# Patient Record
Sex: FEMALE | Race: WHITE | Employment: FULL TIME | URBAN - METROPOLITAN AREA
[De-identification: names, ages, dates, MRNs, and addresses within clinical notes are randomized per-mention and may not be internally consistent; named-entity substitution may affect disease eponyms.]

---

## 2017-03-17 ENCOUNTER — OFFICE VISIT (OUTPATIENT)
Dept: FAMILY MEDICINE CLINIC | Age: 52
End: 2017-03-17

## 2017-03-17 VITALS
HEART RATE: 104 BPM | HEIGHT: 65 IN | BODY MASS INDEX: 34.45 KG/M2 | SYSTOLIC BLOOD PRESSURE: 132 MMHG | DIASTOLIC BLOOD PRESSURE: 82 MMHG | WEIGHT: 206.8 LBS

## 2017-03-17 DIAGNOSIS — M79.672 LEFT FOOT PAIN: Primary | ICD-10-CM

## 2017-03-17 DIAGNOSIS — E66.3 OVERWEIGHT: ICD-10-CM

## 2017-03-17 DIAGNOSIS — J30.89 ALLERGIC RHINITIS DUE TO OTHER ALLERGEN: ICD-10-CM

## 2017-03-17 DIAGNOSIS — Z12.11 SCREENING FOR COLON CANCER: ICD-10-CM

## 2017-03-17 DIAGNOSIS — Z87.891 HISTORY OF TOBACCO USE: ICD-10-CM

## 2017-03-17 DIAGNOSIS — Z78.0 MENOPAUSE: ICD-10-CM

## 2017-03-17 PROCEDURE — 99203 OFFICE O/P NEW LOW 30 MIN: CPT | Performed by: FAMILY MEDICINE

## 2017-03-17 ASSESSMENT — ENCOUNTER SYMPTOMS
COUGH: 0
SHORTNESS OF BREATH: 0
GASTROINTESTINAL NEGATIVE: 1
EYES NEGATIVE: 1
WHEEZING: 0
RESPIRATORY NEGATIVE: 1
APNEA: 0

## 2017-04-06 ENCOUNTER — OFFICE VISIT (OUTPATIENT)
Dept: ORTHOPEDIC SURGERY | Age: 52
End: 2017-04-06

## 2017-04-06 VITALS
DIASTOLIC BLOOD PRESSURE: 76 MMHG | HEIGHT: 64 IN | BODY MASS INDEX: 34.15 KG/M2 | HEART RATE: 74 BPM | WEIGHT: 200 LBS | SYSTOLIC BLOOD PRESSURE: 124 MMHG

## 2017-04-06 DIAGNOSIS — M72.2 PLANTAR FASCIITIS, LEFT: Primary | ICD-10-CM

## 2017-04-06 PROCEDURE — 99202 OFFICE O/P NEW SF 15 MIN: CPT | Performed by: PODIATRIST

## 2017-07-17 ENCOUNTER — TELEPHONE (OUTPATIENT)
Dept: FAMILY MEDICINE CLINIC | Age: 52
End: 2017-07-17

## 2017-07-21 ENCOUNTER — TELEPHONE (OUTPATIENT)
Dept: FAMILY MEDICINE CLINIC | Age: 52
End: 2017-07-21

## 2017-07-21 PROBLEM — I26.99 PULMONARY EMBOLISM WITHOUT ACUTE COR PULMONALE (HCC): Status: ACTIVE | Noted: 2017-07-17

## 2017-07-28 ENCOUNTER — TELEPHONE (OUTPATIENT)
Dept: FAMILY MEDICINE CLINIC | Age: 52
End: 2017-07-28

## 2017-08-21 ENCOUNTER — OFFICE VISIT (OUTPATIENT)
Dept: FAMILY MEDICINE CLINIC | Age: 52
End: 2017-08-21

## 2017-08-21 VITALS — DIASTOLIC BLOOD PRESSURE: 80 MMHG | HEART RATE: 113 BPM | SYSTOLIC BLOOD PRESSURE: 124 MMHG

## 2017-08-21 DIAGNOSIS — I26.90 CHRONIC SEPTIC PULMONARY EMBOLISM WITHOUT ACUTE COR PULMONALE (HCC): Primary | ICD-10-CM

## 2017-08-21 DIAGNOSIS — I27.82 CHRONIC SEPTIC PULMONARY EMBOLISM WITHOUT ACUTE COR PULMONALE (HCC): Primary | ICD-10-CM

## 2017-08-21 PROCEDURE — 99213 OFFICE O/P EST LOW 20 MIN: CPT | Performed by: FAMILY MEDICINE

## 2017-08-21 ASSESSMENT — PATIENT HEALTH QUESTIONNAIRE - PHQ9
2. FEELING DOWN, DEPRESSED OR HOPELESS: 0
1. LITTLE INTEREST OR PLEASURE IN DOING THINGS: 0
SUM OF ALL RESPONSES TO PHQ QUESTIONS 1-9: 0
SUM OF ALL RESPONSES TO PHQ9 QUESTIONS 1 & 2: 0

## 2017-12-12 DIAGNOSIS — I27.82 CHRONIC SEPTIC PULMONARY EMBOLISM WITHOUT ACUTE COR PULMONALE (HCC): ICD-10-CM

## 2017-12-12 DIAGNOSIS — I26.90 CHRONIC SEPTIC PULMONARY EMBOLISM WITHOUT ACUTE COR PULMONALE (HCC): ICD-10-CM

## 2017-12-13 RX ORDER — RIVAROXABAN 20 MG/1
TABLET, FILM COATED ORAL
Qty: 30 TABLET | Refills: 1 | Status: SHIPPED | OUTPATIENT
Start: 2017-12-13 | End: 2019-10-04

## 2018-03-12 ENCOUNTER — TELEPHONE (OUTPATIENT)
Dept: FAMILY MEDICINE CLINIC | Age: 53
End: 2018-03-12

## 2018-03-12 NOTE — TELEPHONE ENCOUNTER
Pt states she will be having  oral surgery soon and wants to know if its ok to do so since shes been taking Xarelto  for the pass 6 month.    Pt also wants to know if she should still continue to take  Xarelto she thought it was only for up to 7 months Please review and advise

## 2019-10-02 ENCOUNTER — HOSPITAL ENCOUNTER (OUTPATIENT)
Dept: GENERAL RADIOLOGY | Age: 54
Discharge: HOME | End: 2019-10-02
Payer: COMMERCIAL

## 2019-10-02 ENCOUNTER — HOSPITAL ENCOUNTER (OUTPATIENT)
Age: 54
Discharge: HOME | End: 2019-10-02
Payer: COMMERCIAL

## 2019-10-02 ENCOUNTER — TELEPHONE (OUTPATIENT)
Dept: PRIMARY CARE CLINIC | Age: 54
End: 2019-10-02

## 2019-10-02 ENCOUNTER — OFFICE VISIT (OUTPATIENT)
Dept: PRIMARY CARE CLINIC | Age: 54
End: 2019-10-02
Payer: COMMERCIAL

## 2019-10-02 VITALS
OXYGEN SATURATION: 99 % | SYSTOLIC BLOOD PRESSURE: 130 MMHG | HEART RATE: 89 BPM | DIASTOLIC BLOOD PRESSURE: 84 MMHG | BODY MASS INDEX: 32.96 KG/M2 | WEIGHT: 192 LBS

## 2019-10-02 DIAGNOSIS — Z88.9 MULTIPLE DRUG ALLERGIES: ICD-10-CM

## 2019-10-02 DIAGNOSIS — M54.42 ACUTE MIDLINE LOW BACK PAIN WITH LEFT-SIDED SCIATICA: Primary | ICD-10-CM

## 2019-10-02 DIAGNOSIS — M54.42 ACUTE MIDLINE LOW BACK PAIN WITH LEFT-SIDED SCIATICA: ICD-10-CM

## 2019-10-02 PROCEDURE — 72100 X-RAY EXAM L-S SPINE 2/3 VWS: CPT

## 2019-10-02 PROCEDURE — 99214 OFFICE O/P EST MOD 30 MIN: CPT | Performed by: FAMILY MEDICINE

## 2019-10-02 RX ORDER — METHYLPREDNISOLONE 4 MG/1
TABLET ORAL
Qty: 1 KIT | Refills: 0 | Status: SHIPPED | OUTPATIENT
Start: 2019-10-02 | End: 2019-10-08

## 2019-10-02 ASSESSMENT — ENCOUNTER SYMPTOMS
ABDOMINAL PAIN: 0
SHORTNESS OF BREATH: 0
EYES NEGATIVE: 1
CHEST TIGHTNESS: 0
BACK PAIN: 1

## 2019-10-02 ASSESSMENT — PATIENT HEALTH QUESTIONNAIRE - PHQ9
SUM OF ALL RESPONSES TO PHQ9 QUESTIONS 1 & 2: 0
SUM OF ALL RESPONSES TO PHQ QUESTIONS 1-9: 0
2. FEELING DOWN, DEPRESSED OR HOPELESS: 0
SUM OF ALL RESPONSES TO PHQ QUESTIONS 1-9: 0
1. LITTLE INTEREST OR PLEASURE IN DOING THINGS: 0

## 2019-10-04 DIAGNOSIS — M51.36 DDD (DEGENERATIVE DISC DISEASE), LUMBAR: ICD-10-CM

## 2019-10-04 DIAGNOSIS — M54.40 MIDLINE LOW BACK PAIN WITH SCIATICA, SCIATICA LATERALITY UNSPECIFIED, UNSPECIFIED CHRONICITY: Primary | ICD-10-CM

## 2019-10-07 ENCOUNTER — TELEPHONE (OUTPATIENT)
Dept: PRIMARY CARE CLINIC | Age: 54
End: 2019-10-07

## 2019-11-06 ENCOUNTER — HOSPITAL ENCOUNTER (OUTPATIENT)
Dept: PHYSICAL THERAPY | Age: 54
Setting detail: THERAPIES SERIES
Discharge: HOME | End: 2019-11-06
Payer: COMMERCIAL

## 2019-11-06 PROCEDURE — 97110 THERAPEUTIC EXERCISES: CPT

## 2019-11-06 PROCEDURE — 97161 PT EVAL LOW COMPLEX 20 MIN: CPT

## 2019-11-11 ENCOUNTER — HOSPITAL ENCOUNTER (OUTPATIENT)
Dept: PHYSICAL THERAPY | Age: 54
Setting detail: THERAPIES SERIES
Discharge: HOME | End: 2019-11-11
Payer: COMMERCIAL

## 2019-11-11 PROCEDURE — 97110 THERAPEUTIC EXERCISES: CPT

## 2019-11-18 ENCOUNTER — HOSPITAL ENCOUNTER (OUTPATIENT)
Dept: PHYSICAL THERAPY | Age: 54
Setting detail: THERAPIES SERIES
Discharge: HOME | End: 2019-11-18
Payer: COMMERCIAL

## 2019-11-25 ENCOUNTER — HOSPITAL ENCOUNTER (OUTPATIENT)
Dept: PHYSICAL THERAPY | Age: 54
Setting detail: THERAPIES SERIES
Discharge: HOME | End: 2019-11-25
Payer: COMMERCIAL

## 2019-11-25 PROCEDURE — G0283 ELEC STIM OTHER THAN WOUND: HCPCS

## 2019-11-25 PROCEDURE — 97110 THERAPEUTIC EXERCISES: CPT

## 2019-12-04 ENCOUNTER — HOSPITAL ENCOUNTER (OUTPATIENT)
Dept: PHYSICAL THERAPY | Age: 54
Setting detail: THERAPIES SERIES
Discharge: HOME | End: 2019-12-04
Payer: COMMERCIAL

## 2019-12-04 PROCEDURE — 97110 THERAPEUTIC EXERCISES: CPT

## 2019-12-09 ENCOUNTER — HOSPITAL ENCOUNTER (OUTPATIENT)
Dept: PHYSICAL THERAPY | Age: 54
Setting detail: THERAPIES SERIES
Discharge: HOME | End: 2019-12-09
Payer: COMMERCIAL

## 2019-12-09 PROCEDURE — 97110 THERAPEUTIC EXERCISES: CPT

## 2021-11-05 ENCOUNTER — OFFICE VISIT (OUTPATIENT)
Dept: PRIMARY CARE CLINIC | Age: 56
End: 2021-11-05
Payer: COMMERCIAL

## 2021-11-05 VITALS
DIASTOLIC BLOOD PRESSURE: 84 MMHG | OXYGEN SATURATION: 98 % | SYSTOLIC BLOOD PRESSURE: 104 MMHG | HEIGHT: 65 IN | BODY MASS INDEX: 31.89 KG/M2 | TEMPERATURE: 98.6 F | WEIGHT: 191.4 LBS | HEART RATE: 88 BPM

## 2021-11-05 DIAGNOSIS — R10.13 EPIGASTRIC ABDOMINAL PAIN: ICD-10-CM

## 2021-11-05 DIAGNOSIS — Z13.220 LIPID SCREENING: ICD-10-CM

## 2021-11-05 DIAGNOSIS — M54.9 MID BACK PAIN: ICD-10-CM

## 2021-11-05 DIAGNOSIS — R14.0 ABDOMINAL BLOATING: Primary | ICD-10-CM

## 2021-11-05 DIAGNOSIS — Z13.1 DIABETES MELLITUS SCREENING: ICD-10-CM

## 2021-11-05 PROCEDURE — 99214 OFFICE O/P EST MOD 30 MIN: CPT | Performed by: FAMILY MEDICINE

## 2021-11-05 SDOH — ECONOMIC STABILITY: FOOD INSECURITY: WITHIN THE PAST 12 MONTHS, THE FOOD YOU BOUGHT JUST DIDN'T LAST AND YOU DIDN'T HAVE MONEY TO GET MORE.: NEVER TRUE

## 2021-11-05 SDOH — ECONOMIC STABILITY: FOOD INSECURITY: WITHIN THE PAST 12 MONTHS, YOU WORRIED THAT YOUR FOOD WOULD RUN OUT BEFORE YOU GOT MONEY TO BUY MORE.: NEVER TRUE

## 2021-11-05 ASSESSMENT — ENCOUNTER SYMPTOMS
ALLERGIC/IMMUNOLOGIC NEGATIVE: 1
EYES NEGATIVE: 1
BACK PAIN: 1
ABDOMINAL PAIN: 1

## 2021-11-05 ASSESSMENT — PATIENT HEALTH QUESTIONNAIRE - PHQ9
1. LITTLE INTEREST OR PLEASURE IN DOING THINGS: 0
SUM OF ALL RESPONSES TO PHQ QUESTIONS 1-9: 0
2. FEELING DOWN, DEPRESSED OR HOPELESS: 0
SUM OF ALL RESPONSES TO PHQ QUESTIONS 1-9: 0
SUM OF ALL RESPONSES TO PHQ QUESTIONS 1-9: 0
SUM OF ALL RESPONSES TO PHQ9 QUESTIONS 1 & 2: 0

## 2021-11-05 ASSESSMENT — SOCIAL DETERMINANTS OF HEALTH (SDOH): HOW HARD IS IT FOR YOU TO PAY FOR THE VERY BASICS LIKE FOOD, HOUSING, MEDICAL CARE, AND HEATING?: NOT HARD AT ALL

## 2021-11-05 NOTE — PROGRESS NOTES
SUBJECTIVE:  Patient ID: Bryn Hernandez is a 55 y.o. female. Chief Complaint:  Chief Complaint   Patient presents with    Back Pain     bra strap line radiates to front.   worse at night x 6-8 wks       HPI   55year old Female  Pain start Back Rt side  Off/on x 6-8 weeks  Radiates to front mid abdomen last 4-5 H   Walk up & down  Stretching   Peppermint   No N/V/D  Always incident late @night 12-1 am    Past Medical History:   Diagnosis Date    Chronic sinusitis     Environmental allergies     Mitral (valve) prolapse      Past Surgical History:   Procedure Laterality Date    LEG SURGERY Left 2017    Fx Tibia & fibula + plate & pins    SINUS SURGERY  age 32 yo     Allergies   Allergen Reactions    Aleve [Naproxen Sodium]      Hives    Aspirin      hives    Erythromycin      Hives  disorientation    Ibuprofen      Hives     Influenza Vac Typ A&B Surf Ant      Swelling & throat @young age before teen age year   [place]ella [place] Nitrous Oxide      Swelling & throat    Penicillins      Hives & swelling    Sulfa Antibiotics      Swelling & Hives        Family History   Problem Relation Age of Onset    Other Mother         DM ,Kid disorder ,+ renal stent + heart ,age [de-identified]     Diabetes Father         CABG x4  age 80     High Cholesterol Father     Heart Disease Father     Other Maternal Grandfather          @age 80    Other Sister         @age 52 Brain aneurysm     Social History     Social History Narrative     x 1 year after 21 years dating    No children    No tobacco    FT job       Patient Active Problem List   Diagnosis    Sinusitis, chronic    Allergic rhinitis due to other allergen    Menopause    History of tobacco use    Patient overweight    Plantar fasciitis, left    Pulmonary embolism without acute cor pulmonale (HCC)     Current Outpatient Medications   Medication Sig Dispense Refill    Triamcinolone Acetonide (NASACORT AQ NA) by Nasal route daily      fexofenadine (ALLEGRA) 180 MG tablet Take 180 mg by mouth daily. No current facility-administered medications for this visit. No results found for: WBC, HGB, HCT, MCV, PLT  No results found for: CHOL  No results found for: TRIG  No results found for: HDL  No results found for: LDLCHOLESTEROL, LDLCALC  No results found for: LABVLDL, VLDL  No results found for: CHOLHDLRATIO    Chemistry    No results found for: NA, K, CL, CO2, BUN, CREATININE No results found for: CALCIUM, ALKPHOS, AST, ALT, BILITOT         Review of Systems   Constitutional: Negative. HENT: Negative. Eyes: Negative. Respiratory:        Hx PE 2017 post RT leg injury & surgery   Cardiovascular: Negative. Negative for chest pain, palpitations and leg swelling. Gastrointestinal: Positive for abdominal pain. No colonoscopy yet  Bloating  Epigastric pain   Endocrine: Negative. Genitourinary: Negative for dysuria, flank pain and urgency. GYN 2017  Uterus & ovaries intact  Menopause age 44  Last Mammogram 2018   Musculoskeletal: Positive for back pain. Mid back pain off/on x 8 w   Allergic/Immunologic: Negative. Dr Soraya Lara  2 allergy shot every month   Neurological: Negative. Negative for dizziness and headaches. Hematological: Negative. DVT 2017   + PE 2017   Rx Xarleto   Psychiatric/Behavioral: Negative for behavioral problems. OBJECTIVE:  /84 (Site: Right Upper Arm, Position: Sitting, Cuff Size: Large Adult)   Pulse 88   Temp 98.6 °F (37 °C) (Oral)   Ht 5' 4.5\" (1.638 m)   Wt 191 lb 6.4 oz (86.8 kg)   LMP 08/07/2010   SpO2 98%   BMI 32.35 kg/m²   Physical Exam  HENT:      Head: Normocephalic. Eyes:      Extraocular Movements: Extraocular movements intact. Pupils: Pupils are equal, round, and reactive to light. Cardiovascular:      Rate and Rhythm: Normal rate and regular rhythm. Heart sounds: Normal heart sounds.    Pulmonary:      Effort: Pulmonary effort is normal.      Breath sounds: Normal breath sounds. No wheezing or rhonchi. Abdominal:      General: There is no distension. Palpations: There is no mass. Tenderness: There is no guarding or rebound. Hernia: No hernia is present. Comments: Epigastric tendrness   Musculoskeletal:      Cervical back: Neck supple. Right lower leg: No edema. Left lower leg: No edema. Comments: Spine no point of tenderness   Skin:     Findings: No rash. Neurological:      General: No focal deficit present. Mental Status: She is alert and oriented to person, place, and time. Psychiatric:         Mood and Affect: Mood normal.         Behavior: Behavior normal.         Thought Content: Thought content normal.         Judgment: Judgment normal.         ASSESSMENT/PLAN:      Diagnosis Orders   1. Abdominal bloating  CT CHEST ABDOMEN PELVIS W WO CONTRAST    CBC Auto Differential    Comprehensive Metabolic Panel    TSH without Reflex   2. Mid back pain  CT CHEST ABDOMEN PELVIS W WO CONTRAST   3. Epigastric abdominal pain  CT CHEST ABDOMEN PELVIS W WO CONTRAST    CBC Auto Differential    Comprehensive Metabolic Panel    TSH without Reflex   4. Diabetes mellitus screening  Hemoglobin A1C   5.  Lipid screening  Lipid Panel       As above  Visit 30 min  Covid vaccine done  No Flu shot ?allergic  Advice due Mammogram & GYN update Dr Jaylene Hua  Advice due Colonoscopy

## 2021-11-11 ENCOUNTER — HOSPITAL ENCOUNTER (OUTPATIENT)
Dept: CT IMAGING | Age: 56
Discharge: HOME | End: 2021-11-11
Payer: COMMERCIAL

## 2021-11-11 DIAGNOSIS — M54.9 MID BACK PAIN: ICD-10-CM

## 2021-11-11 DIAGNOSIS — R10.13 EPIGASTRIC ABDOMINAL PAIN: ICD-10-CM

## 2021-11-11 DIAGNOSIS — R14.0 ABDOMINAL BLOATING: ICD-10-CM

## 2021-11-11 PROCEDURE — 6360000004 HC RX CONTRAST MEDICATION: Performed by: FAMILY MEDICINE

## 2021-11-11 PROCEDURE — 74177 CT ABD & PELVIS W/CONTRAST: CPT

## 2021-11-11 RX ADMIN — IOPAMIDOL 75 ML: 755 INJECTION, SOLUTION INTRAVENOUS at 08:53

## 2021-11-11 RX ADMIN — IOHEXOL 50 ML: 240 INJECTION, SOLUTION INTRATHECAL; INTRAVASCULAR; INTRAVENOUS; ORAL at 08:53

## 2021-11-12 DIAGNOSIS — Z13.220 LIPID SCREENING: ICD-10-CM

## 2021-11-12 DIAGNOSIS — K82.9 GALL BLADDER DISEASE: Primary | ICD-10-CM

## 2021-11-12 DIAGNOSIS — R14.0 ABDOMINAL BLOATING: ICD-10-CM

## 2021-11-12 DIAGNOSIS — R10.13 EPIGASTRIC ABDOMINAL PAIN: ICD-10-CM

## 2021-11-12 DIAGNOSIS — Z13.1 DIABETES MELLITUS SCREENING: ICD-10-CM

## 2021-11-12 LAB
A/G RATIO: 1.9 (ref 1.1–2.2)
ALBUMIN SERPL-MCNC: 4.4 G/DL (ref 3.4–5)
ALP BLD-CCNC: 112 U/L (ref 40–129)
ALT SERPL-CCNC: 17 U/L (ref 10–40)
ANION GAP SERPL CALCULATED.3IONS-SCNC: 15 MMOL/L (ref 3–16)
AST SERPL-CCNC: 16 U/L (ref 15–37)
BASOPHILS ABSOLUTE: 0 K/UL (ref 0–0.2)
BASOPHILS RELATIVE PERCENT: 0.7 %
BILIRUB SERPL-MCNC: 0.5 MG/DL (ref 0–1)
BUN BLDV-MCNC: 8 MG/DL (ref 7–20)
CALCIUM SERPL-MCNC: 9.5 MG/DL (ref 8.3–10.6)
CHLORIDE BLD-SCNC: 104 MMOL/L (ref 99–110)
CHOLESTEROL, TOTAL: 194 MG/DL (ref 0–199)
CO2: 25 MMOL/L (ref 21–32)
CREAT SERPL-MCNC: 0.7 MG/DL (ref 0.6–1.1)
EOSINOPHILS ABSOLUTE: 0.2 K/UL (ref 0–0.6)
EOSINOPHILS RELATIVE PERCENT: 3.1 %
GFR AFRICAN AMERICAN: >60
GFR NON-AFRICAN AMERICAN: >60
GLUCOSE BLD-MCNC: 90 MG/DL (ref 70–99)
HCT VFR BLD CALC: 40.3 % (ref 36–48)
HDLC SERPL-MCNC: 51 MG/DL (ref 40–60)
HEMOGLOBIN: 13.7 G/DL (ref 12–16)
LDL CHOLESTEROL CALCULATED: 127 MG/DL
LYMPHOCYTES ABSOLUTE: 2 K/UL (ref 1–5.1)
LYMPHOCYTES RELATIVE PERCENT: 33.1 %
MCH RBC QN AUTO: 33.7 PG (ref 26–34)
MCHC RBC AUTO-ENTMCNC: 33.9 G/DL (ref 31–36)
MCV RBC AUTO: 99.4 FL (ref 80–100)
MONOCYTES ABSOLUTE: 0.4 K/UL (ref 0–1.3)
MONOCYTES RELATIVE PERCENT: 7.5 %
NEUTROPHILS ABSOLUTE: 3.3 K/UL (ref 1.7–7.7)
NEUTROPHILS RELATIVE PERCENT: 55.6 %
PDW BLD-RTO: 13.2 % (ref 12.4–15.4)
PLATELET # BLD: 307 K/UL (ref 135–450)
PMV BLD AUTO: 9.4 FL (ref 5–10.5)
POTASSIUM SERPL-SCNC: 4.5 MMOL/L (ref 3.5–5.1)
RBC # BLD: 4.06 M/UL (ref 4–5.2)
SODIUM BLD-SCNC: 144 MMOL/L (ref 136–145)
TOTAL PROTEIN: 6.7 G/DL (ref 6.4–8.2)
TRIGL SERPL-MCNC: 81 MG/DL (ref 0–150)
TSH SERPL DL<=0.05 MIU/L-ACNC: 1.31 UIU/ML (ref 0.27–4.2)
VLDLC SERPL CALC-MCNC: 16 MG/DL
WBC # BLD: 6 K/UL (ref 4–11)

## 2021-11-13 LAB
ESTIMATED AVERAGE GLUCOSE: 105.4 MG/DL
HBA1C MFR BLD: 5.3 %

## 2021-11-19 ENCOUNTER — OFFICE VISIT (OUTPATIENT)
Dept: SURGERY | Age: 56
End: 2021-11-19
Payer: COMMERCIAL

## 2021-11-19 VITALS
HEART RATE: 88 BPM | DIASTOLIC BLOOD PRESSURE: 83 MMHG | WEIGHT: 192.4 LBS | BODY MASS INDEX: 32.06 KG/M2 | TEMPERATURE: 97.9 F | SYSTOLIC BLOOD PRESSURE: 113 MMHG | HEIGHT: 65 IN

## 2021-11-19 DIAGNOSIS — K80.20 SYMPTOMATIC CHOLELITHIASIS: Primary | ICD-10-CM

## 2021-11-19 PROCEDURE — 99204 OFFICE O/P NEW MOD 45 MIN: CPT | Performed by: SURGERY

## 2021-11-19 NOTE — LETTER
[facility] Sur[facility] of Ina Long M.D. [facility]  [address]  [address] [facility]. [address]  Phone: [phone] Fax: [phone]            Surgery Order/Time:  21/3:15 PM  Conf. # _________________  Scheduled by: Rikki Rail Lpn                               **[facility]**  Facility: [facility].    Surgery Date: 2021 Time: 730am    Pt arrival: 0530  Second Surgeon: N/A        Patient Name: Emory Michlele  : 1965  Home [id number] (home) [phone] (work) Address:[address] [place] [address] [address] [place] [address]  PCP:  Salty Martin MD   Does patient speak English?: yes    needed? no                                             PROCEDURE: Laparoscopic cholecystectomy with gram  CPT: 62672: Laparoscopic Cholecystectomy with Cholangiogram    DIAGNOSIS:      ICD-10-CM    1. Symptomatic cholelithiasis  K80.20        Anesthesia: General  Time Needed:  1 hour   Pt Position:  supine      Outpatient ____ Admit ______  Assist._____   Cardiac Clearance: no  Patient to meet with Anesthesiology prior to surgery: no    Patient Allergies: Allergies   Allergen Reactions    Aleve [Naproxen Sodium]      Hives    Aspirin      hives    Erythromycin      Hives  disorientation    Ibuprofen      Hives     Influenza Vac Typ A&B Surf Ant      Swelling & throat @young age before teen age year   [place] Nitrous Oxide      Swelling & throat    Penicillins      Hives & swelling    Sulfa Antibiotics      Swelling & Hives      Pre-Op H&P to be done by: Salty Martin MD  Pre-Op Antibiotics: Clindamycin: 600mg IV On Call to OR  ** Lovenox 40 mg SQ 2 hrs. Pre-op    Physician: Ana Rosa Iglesias MD Date: 21             Insurance:    ID #      Ph #   Date called ________________   Dm Owusu to: _____________ Precert Needed?  Yes  /  No  PreAuth # & Details   ______________________________________________________________________

## 2021-11-22 ENCOUNTER — TELEPHONE (OUTPATIENT)
Dept: SURGERY | Age: 56
End: 2021-11-22

## 2021-11-29 ENCOUNTER — TELEPHONE (OUTPATIENT)
Dept: PRIMARY CARE CLINIC | Age: 56
End: 2021-11-29

## 2021-11-29 NOTE — TELEPHONE ENCOUNTER
Patient called office she stated she was on the phone with someone that was checking what else she needed to get done before her surgery on 12/14, she was unsure if it was the pre cert department or not but if someone can reach out to patient with assistance

## 2021-12-03 ENCOUNTER — OFFICE VISIT (OUTPATIENT)
Dept: PRIMARY CARE CLINIC | Age: 56
End: 2021-12-03
Payer: COMMERCIAL

## 2021-12-03 VITALS
DIASTOLIC BLOOD PRESSURE: 87 MMHG | HEART RATE: 88 BPM | HEIGHT: 65 IN | OXYGEN SATURATION: 97 % | SYSTOLIC BLOOD PRESSURE: 116 MMHG | WEIGHT: 191 LBS | BODY MASS INDEX: 31.82 KG/M2 | TEMPERATURE: 97.4 F

## 2021-12-03 DIAGNOSIS — Z01.818 PREOPERATIVE CLEARANCE: Primary | ICD-10-CM

## 2021-12-03 DIAGNOSIS — K82.9 GALLBLADDER DISORDER: ICD-10-CM

## 2021-12-03 PROCEDURE — 99212 OFFICE O/P EST SF 10 MIN: CPT | Performed by: FAMILY MEDICINE

## 2021-12-03 ASSESSMENT — ENCOUNTER SYMPTOMS
SHORTNESS OF BREATH: 0
SINUS PRESSURE: 0
SINUS PAIN: 0
COUGH: 0
ABDOMINAL DISTENTION: 0
EYE DISCHARGE: 0
CHOKING: 0
SORE THROAT: 0
EYE ITCHING: 0
BLOOD IN STOOL: 0
ABDOMINAL PAIN: 0
RHINORRHEA: 0
COLOR CHANGE: 0
PHOTOPHOBIA: 0
CHEST TIGHTNESS: 0
EYE PAIN: 0
TROUBLE SWALLOWING: 0
NAUSEA: 0
VOMITING: 0
VOICE CHANGE: 0
EYE REDNESS: 0
APNEA: 0
DIARRHEA: 0
BACK PAIN: 0
WHEEZING: 0
CONSTIPATION: 0

## 2021-12-03 NOTE — PROGRESS NOTES
Component Value Date    WBC 6.0 11/12/2021    HGB 13.7 11/12/2021    HCT 40.3 11/12/2021    MCV 99.4 11/12/2021     11/12/2021     Lab Results   Component Value Date    CHOL 194 11/12/2021     Lab Results   Component Value Date    TRIG 81 11/12/2021     Lab Results   Component Value Date    HDL 51 11/12/2021     Lab Results   Component Value Date    LDLCALC 127 (H) 11/12/2021     Lab Results   Component Value Date    LABVLDL 16 11/12/2021     No results found for: [facility]    Chemistry        Component Value Date/Time     11/12/2021 1019    K 4.5 11/12/2021 1019     11/12/2021 1019    CO2 25 11/12/2021 1019    BUN 8 11/12/2021 1019    CREATININE 0.7 11/12/2021 1019        Component Value Date/Time    CALCIUM 9.5 11/12/2021 1019    ALKPHOS 112 11/12/2021 1019    AST 16 11/12/2021 1019    ALT 17 11/12/2021 1019    BILITOT 0.5 11/12/2021 1019            Review of Systems   Constitutional: Negative for activity change, appetite change, chills, diaphoresis, fatigue, fever and unexpected weight change. HENT: Negative for congestion, ear discharge, ear pain, hearing loss, nosebleeds, postnasal drip, rhinorrhea, sinus pressure, sinus pain, sore throat, tinnitus, trouble swallowing and voice change. Eyes: Negative for photophobia, pain, discharge, redness, itching and visual disturbance. Respiratory: Negative for apnea, cough, choking, chest tightness, shortness of breath and wheezing. Quit Tobacco 2016   Vap flavor   Cardiovascular: Negative for chest pain, palpitations and leg swelling. Gastrointestinal: Negative for abdominal distention, abdominal pain, blood in stool, constipation, diarrhea, nausea and vomiting. Colonoscopy is due   NO real abdominal pain  She will schedule after New Year   Endocrine: Negative for cold intolerance, heat intolerance, polydipsia, polyphagia and polyuria. Genitourinary: Negative for dysuria and frequency.         Uterus & Ovaries intact  GYN Dr Terence Saravia  Menopause @age 43   Musculoskeletal: Negative for back pain and gait problem. Skin: Negative for color change and rash. Allergic/Immunologic: Negative for environmental allergies and food allergies. Allergy shot twice x last 20 +years   Neurological: Negative for dizziness, tremors, light-headedness, numbness and headaches. Psychiatric/Behavioral: Negative for agitation, behavioral problems, decreased concentration and sleep disturbance. The patient is not hyperactive. Trouble sleeping  Get hot easily       OBJECTIVE:  /87 (Site: Left Upper Arm, Position: Sitting, Cuff Size: Medium Adult)   Pulse 88   Temp 97.4 °F (36.3 °C) (Oral)   Ht 5' 4.5\" (1.638 m)   Wt 191 lb (86.6 kg)   LMP 08/07/2010   SpO2 97%   BMI 32.28 kg/m²   Physical Exam  Constitutional:       General: She is not in acute distress. Appearance: She is well-developed. She is not diaphoretic. HENT:      Head: Normocephalic. Right Ear: External ear normal.      Left Ear: External ear normal.      Nose: Nose normal.   Eyes:      General: No scleral icterus. Right eye: No discharge. Left eye: No discharge. Conjunctiva/sclera: Conjunctivae normal.      Pupils: Pupils are equal, round, and reactive to light. Neck:      Thyroid: No thyromegaly. Cardiovascular:      Rate and Rhythm: Normal rate and regular rhythm. Heart sounds: Normal heart sounds. No murmur heard. Pulmonary:      Effort: Pulmonary effort is normal. No respiratory distress. Breath sounds: Normal breath sounds. No wheezing or rales. Chest:      Chest wall: No tenderness. Abdominal:      General: Bowel sounds are normal. There is no distension. Palpations: Abdomen is soft. There is no mass. Tenderness: There is no abdominal tenderness. There is no guarding or rebound. Musculoskeletal:         General: Normal range of motion. Cervical back: Normal range of motion and neck supple. Lymphadenopathy:      Cervical: No cervical adenopathy. Skin:     General: Skin is warm. Findings: No rash. Neurological:      Mental Status: She is alert and oriented to person, place, and time. Deep Tendon Reflexes: Reflexes are normal and symmetric. Psychiatric:         Behavior: Behavior normal.         Thought Content: Thought content normal.         Judgment: Judgment normal.         ASSESSMENT/PLAN:      Diagnosis Orders   1. Preoperative clearance     2.  Gallbladder disorder           OK for planned surgery Cholecystectomy by Dr Dalila Johnson was explained by surgeon  Pt is aware of possible risk & complications  DVT prophylaxis as per surgeon  Covid Vaccine x 2 done Booster after surgery  No Flu shot due Hx Allergic

## 2021-12-06 NOTE — PROGRESS NOTES
PATIENT NAME: Tomas Burger OF BIRTH: 1965     TODAY'S DATE: 2021    Reason for Visit:  Right upper back pain    Requesting Physician:  Dr. Wu Milder:              The patient is a 55 y.o. female with a PMHx as delineated below who presents with multiple episodes of right upper back pain that wakes her from sleep. This pain last for at least an hour.      Chief Complaint   Patient presents with    New Patient     gallbladder disease       REVIEW OF SYSTEMS:  CONSTITUTIONAL:  negative  HEENT:  negative  RESPIRATORY:  negative  CARDIOVASCULAR:  negative  GASTROINTESTINAL:  negative except for abdominal pain  GENITOURINARY:  negative  HEMATOLOGIC/LYMPHATIC:  negative  MUSCULOSKELETAL: negative  NEUROLOGICAL:  negative    PMH  Past Medical History:   Diagnosis Date    Chronic sinusitis     Environmental allergies     Mitral (valve) prolapse        PSH  Past Surgical History:   Procedure Laterality Date    LEG SURGERY Left 2017    Fx Tibia & fibula + plate & pins    SINUS SURGERY  age 34 yo       Social History  Social History     Socioeconomic History    Marital status:      Spouse name: Khang Jordan Number of children: 0    Years of education: Not on file    Highest education level: Not on file   Occupational History     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   Tobacco Use    Smoking status: Former Smoker     Packs/day: 0.50     Years: 25.00     Pack years: 12.50     Types: Cigarettes     Quit date: 3/24/2014     Years since quittin.7    Smokeless tobacco: Never Used    Tobacco comment: advice   Substance and Sexual Activity    Alcohol use: Yes     Comment: rare    Drug use: No    Sexual activity: Yes     Partners: Male     Comment: Dating x 16 years ,No biological kids   Other Topics Concern    Not on file   Social History Narrative     x 1 year after 21 years dating    No children    No tobacco    FT job     Social Determinants of Health Financial Resource Strain: Low Risk     Difficulty of Paying Living Expenses: Not hard at all   Food Insecurity: No Food Insecurity    Worried About Running Out of Food in the Last Year: Never true    Zita of Food in the Last Year: Never true   Transportation Needs:     Lack of Transportation (Medical): Not on file    Lack of Transportation (Non-Medical):  Not on file   Physical Activity:     Days of Exercise per Week: Not on file    Minutes of Exercise per Session: Not on file   Stress:     Feeling of Stress : Not on file   Social Connections:     Frequency of Communication with Friends and Family: Not on file    Frequency of Social Gatherings with Friends and Family: Not on file    Attends Cheondoism Services: Not on file    Active Member of [address] [place] [address] [place] Acquisitions or Organizations: Not on file    Attends Club or Organization Meetings: Not on file    Marital Status: Not on file   Intimate Partner Violence:     Fear of Current or Ex-Partner: Not on file    Emotionally Abused: Not on file    Physically Abused: Not on file    Sexually Abused: Not on file   Housing Stability:     Unable to Pay for Housing in the Last Year: Not on file    Number of [place] in the Last Year: Not on file    Unstable Housing in the Last Year: Not on file       Family History:       Problem Relation Age of Onset    Other Mother         DM ,Kid disorder ,+ renal stent + heart ,age [de-identified]     Diabetes Father         CABG x4  age 80     High Cholesterol Father     Heart Disease Father     Other Maternal Grandfather          @age 80    Other Sister         @age 52 Brain aneurysm       Allergy:   Allergies   Allergen Reactions    Aleve [Naproxen Sodium]      Hives    Aspirin      hives    Erythromycin      Hives  disorientation    Ibuprofen      Hives     Influenza Vac Typ A&B Surf Ant      Swelling & throat @young age before teen age year   [facility] Nitrous Oxide      Swelling & throat    Penicillins      Hives & swelling    Sulfa Antibiotics      Swelling & Hives        PHYSICAL EXAM:  VITALS:  /83   Pulse 88   Temp 97.9 °F (36.6 °C)   Ht 5' 4.5\" (1.638 m)   Wt 192 lb 6.4 oz (87.3 kg)   LMP 08/07/2010   BMI 32.52 kg/m²     CONSTITUTIONAL:  alert, no apparent distress and mildly obese  EYES:  sclera clear  ENT:  normocepalic, without obvious abnormality  NECK:  supple, symmetrical, trachea midline and no carotid bruits  LUNGS:  clear to auscultation  CARDIOVASCULAR:  regular rate and rhythm and no murmur noted  ABDOMEN:  no scars, normal bowel sounds, soft, non-distended, non-tender, voluntary guarding absent, no masses palpated and hernia absent  MUSCULOSKELETAL:  0+ pitting edema lower extremities  NEUROLOGIC:  Mental Status Exam:  Level of Alertness:   awake  Orientation:   person, place, time  SKIN:  no bruising or bleeding and normal skin color, texture, turgor    Radiology Review:    1. No acute abnormality in the chest   2. 5 mm left lower lobe ground-glass nodule with central lucency.  Recommend   follow-up chest CT in 2 years   3. Gallbladder wall edema, and focal thickening of the gallbladder fundus   possibly representing adenomyomatosis.  Recommend gallbladder ultrasound   4. Colonic diverticulosis     IMPRESSION/RECOMMENDATIONS:    The patient has findings consistent with symptomatic cholelithiasis. As a result, we will proceed with laparoscopic cholecystectomy. The risks, benefits, and expected recovery were discussed with the patient and she wishes to proceed.      Tyler Stein MD

## 2021-12-08 NOTE — PROGRESS NOTES
PRE-OP INSTRUCTIONS FOR THE SURGICAL PATIENT YOU ARE UNABLE TO MAKE CONTACT FOR AN INTERVIEW:    All patients having surgery or anesthesia are required to be Covid tested OR to have been vaccinated at least 14 days prior to your procedure. It is very important to return our call to [phone] and notify the staff of your last vaccination date otherwise you will be required to complete Covid PCR test within the 5-6 days prior to surgery & quarantine. The results will need to be faxed to PreAdmission Testing at [phone]. 1. Follow all instructions provided to you from your surgeons office, including your ARRIVAL TIME. 2. Enter the MAIN entrance located on [address] and report to the desk. 3. Bring your insurance & photo ID with you. You may also be asked to pay a co-pay, as you may want to bring a check or credit card with you. 4. Leave all other valuables at home. 5. Arrange for someone to drive you home and be with you for the first 24 hours after discharge. 6. Bring your medication list with you day of surgery with doses and frequency listed (including over the counter medications)  7. You must contact your surgeon for Instructions regarding:              - ALL medication instructions, especially if taking blood thinners, aspirin, or diabetic medication.         -Bariatric patients call surgeon if on diabetic medications as some need to be stopped 1 week preop  - IF  there is a change in your physical condition such as a cold, fever, rash, cuts, sores or any other infection, especially near your surgical site. 8. A Pre-op History and Physical for surgery MUST be completed by your Physician or an Urgent Care within 30 days of your procedure date. Please bring a copy with you on the day of your procedure and along with any other testing performed. 9. DO NOT EAT ANYTHING eight hours prior to arrival for surgery.   May have up to 8 ounces of water 4 hours prior to arrival for surgery. NOTE: ALL Gastric, Bariatric and Bowel surgery patients MUST follow their surgeon's instructions. 10. No gum, candy, mints, or ice chips day of procedure. 11. Please refrain from drinking alcohol the day before or day of your procedure. 12. Please do not smoke the day of your procedure. 13. Dress in loose, comfortable clothing appropriate for redressing after your procedure. Do not wear jewelry (including body piercings), make-up, fingernail polish, lotion, powders or metal hairclips. 15. Contacts will need to be removed prior to surgery. You may want to bring your eye glasses to wear immediately before and after surgery. 14. Dentures will need to be removed before your procedure. 13. Bring cases for your glasses, contacts, dentures, or hearing aids to protect them while you are in surgery. 16. If you use a CPAP, please bring it with you on the day of your procedure. 17. Do not shave or wax for 72 hours prior to procedure near your operative site  18. FOR WOMAN OF CHILDBEARING AGE ONLY- please make sure we can collect a urine sample on arrival.     If you have further questions, you may contact your surgeon's office or us at [phone]     Left instructions on patient's voicemail.     Luan Paget, RN.12/8/2021 .3:46 PM

## 2021-12-13 ENCOUNTER — ANESTHESIA EVENT (OUTPATIENT)
Dept: OPERATING ROOM | Age: 56
End: 2021-12-13
Payer: COMMERCIAL

## 2021-12-14 ENCOUNTER — TELEPHONE (OUTPATIENT)
Dept: PRIMARY CARE CLINIC | Age: 56
End: 2021-12-14

## 2021-12-14 ENCOUNTER — HOSPITAL ENCOUNTER (OUTPATIENT)
Age: 56
Setting detail: OUTPATIENT SURGERY
Discharge: HOME | End: 2021-12-14
Attending: SURGERY | Admitting: SURGERY
Payer: COMMERCIAL

## 2021-12-14 ENCOUNTER — APPOINTMENT (OUTPATIENT)
Dept: GENERAL RADIOLOGY | Age: 56
End: 2021-12-14
Attending: SURGERY
Payer: COMMERCIAL

## 2021-12-14 ENCOUNTER — ANESTHESIA (OUTPATIENT)
Dept: OPERATING ROOM | Age: 56
End: 2021-12-14
Payer: COMMERCIAL

## 2021-12-14 VITALS
OXYGEN SATURATION: 97 % | SYSTOLIC BLOOD PRESSURE: 141 MMHG | RESPIRATION RATE: 13 BRPM | HEART RATE: 93 BPM | DIASTOLIC BLOOD PRESSURE: 89 MMHG | TEMPERATURE: 97.8 F | BODY MASS INDEX: 31.82 KG/M2 | WEIGHT: 191 LBS | HEIGHT: 65 IN

## 2021-12-14 VITALS — TEMPERATURE: 96.8 F | DIASTOLIC BLOOD PRESSURE: 98 MMHG | SYSTOLIC BLOOD PRESSURE: 183 MMHG | OXYGEN SATURATION: 99 %

## 2021-12-14 DIAGNOSIS — K80.20 SYMPTOMATIC CHOLELITHIASIS: ICD-10-CM

## 2021-12-14 PROCEDURE — 3600000014 HC SURGERY LEVEL 4 ADDTL 15MIN: Performed by: SURGERY

## 2021-12-14 PROCEDURE — 88304 TISSUE EXAM BY PATHOLOGIST: CPT

## 2021-12-14 PROCEDURE — 7100000001 HC PACU RECOVERY - ADDTL 15 MIN: Performed by: SURGERY

## 2021-12-14 PROCEDURE — 2709999900 HC NON-CHARGEABLE SUPPLY: Performed by: SURGERY

## 2021-12-14 PROCEDURE — 6360000002 HC RX W HCPCS: Performed by: ANESTHESIOLOGY

## 2021-12-14 PROCEDURE — 2580000003 HC RX 258: Performed by: SURGERY

## 2021-12-14 PROCEDURE — 2500000003 HC RX 250 WO HCPCS: Performed by: NURSE ANESTHETIST, CERTIFIED REGISTERED

## 2021-12-14 PROCEDURE — 74300 X-RAY BILE DUCTS/PANCREAS: CPT

## 2021-12-14 PROCEDURE — C1758 CATHETER, URETERAL: HCPCS | Performed by: SURGERY

## 2021-12-14 PROCEDURE — 7100000010 HC PHASE II RECOVERY - FIRST 15 MIN: Performed by: SURGERY

## 2021-12-14 PROCEDURE — 3700000001 HC ADD 15 MINUTES (ANESTHESIA): Performed by: SURGERY

## 2021-12-14 PROCEDURE — 2500000003 HC RX 250 WO HCPCS: Performed by: SURGERY

## 2021-12-14 PROCEDURE — 47563 LAPARO CHOLECYSTECTOMY/GRAPH: CPT | Performed by: SURGERY

## 2021-12-14 PROCEDURE — 2580000003 HC RX 258: Performed by: ANESTHESIOLOGY

## 2021-12-14 PROCEDURE — 6360000002 HC RX W HCPCS: Performed by: NURSE ANESTHETIST, CERTIFIED REGISTERED

## 2021-12-14 PROCEDURE — 6360000002 HC RX W HCPCS: Performed by: SURGERY

## 2021-12-14 PROCEDURE — 3700000000 HC ANESTHESIA ATTENDED CARE: Performed by: SURGERY

## 2021-12-14 PROCEDURE — 3600000004 HC SURGERY LEVEL 4 BASE: Performed by: SURGERY

## 2021-12-14 PROCEDURE — 7100000000 HC PACU RECOVERY - FIRST 15 MIN: Performed by: SURGERY

## 2021-12-14 RX ORDER — SODIUM CHLORIDE 0.9 % (FLUSH) 0.9 %
5-40 SYRINGE (ML) INJECTION PRN
Status: DISCONTINUED | OUTPATIENT
Start: 2021-12-14 | End: 2021-12-14 | Stop reason: HOSPADM

## 2021-12-14 RX ORDER — LIDOCAINE HYDROCHLORIDE 10 MG/ML
INJECTION, SOLUTION EPIDURAL; INFILTRATION; INTRACAUDAL; PERINEURAL PRN
Status: DISCONTINUED | OUTPATIENT
Start: 2021-12-14 | End: 2021-12-14 | Stop reason: SDUPTHER

## 2021-12-14 RX ORDER — 0.9 % SODIUM CHLORIDE 0.9 %
500 INTRAVENOUS SOLUTION INTRAVENOUS
Status: DISCONTINUED | OUTPATIENT
Start: 2021-12-14 | End: 2021-12-14 | Stop reason: HOSPADM

## 2021-12-14 RX ORDER — MEPERIDINE HYDROCHLORIDE 25 MG/ML
12.5 INJECTION INTRAMUSCULAR; INTRAVENOUS; SUBCUTANEOUS EVERY 5 MIN PRN
Status: DISCONTINUED | OUTPATIENT
Start: 2021-12-14 | End: 2021-12-14 | Stop reason: HOSPADM

## 2021-12-14 RX ORDER — DEXAMETHASONE SODIUM PHOSPHATE 4 MG/ML
INJECTION, SOLUTION INTRA-ARTICULAR; INTRALESIONAL; INTRAMUSCULAR; INTRAVENOUS; SOFT TISSUE PRN
Status: DISCONTINUED | OUTPATIENT
Start: 2021-12-14 | End: 2021-12-14 | Stop reason: SDUPTHER

## 2021-12-14 RX ORDER — SODIUM CHLORIDE 0.9 % (FLUSH) 0.9 %
5-40 SYRINGE (ML) INJECTION EVERY 12 HOURS SCHEDULED
Status: DISCONTINUED | OUTPATIENT
Start: 2021-12-14 | End: 2021-12-14 | Stop reason: HOSPADM

## 2021-12-14 RX ORDER — SODIUM CHLORIDE, SODIUM LACTATE, POTASSIUM CHLORIDE, CALCIUM CHLORIDE 600; 310; 30; 20 MG/100ML; MG/100ML; MG/100ML; MG/100ML
INJECTION, SOLUTION INTRAVENOUS CONTINUOUS
Status: DISCONTINUED | OUTPATIENT
Start: 2021-12-14 | End: 2021-12-14 | Stop reason: HOSPADM

## 2021-12-14 RX ORDER — LIDOCAINE HYDROCHLORIDE 10 MG/ML
1 INJECTION, SOLUTION EPIDURAL; INFILTRATION; INTRACAUDAL; PERINEURAL
Status: DISCONTINUED | OUTPATIENT
Start: 2021-12-14 | End: 2021-12-14 | Stop reason: HOSPADM

## 2021-12-14 RX ORDER — OXYCODONE HYDROCHLORIDE 5 MG/1
5 TABLET ORAL EVERY 6 HOURS PRN
Qty: 28 TABLET | Refills: 0 | Status: SHIPPED | OUTPATIENT
Start: 2021-12-14 | End: 2021-12-21

## 2021-12-14 RX ORDER — CLINDAMYCIN PHOSPHATE 600 MG/50ML
600 INJECTION INTRAVENOUS ONCE
Status: COMPLETED | OUTPATIENT
Start: 2021-12-14 | End: 2021-12-14

## 2021-12-14 RX ORDER — SODIUM CHLORIDE 9 MG/ML
25 INJECTION, SOLUTION INTRAVENOUS PRN
Status: DISCONTINUED | OUTPATIENT
Start: 2021-12-14 | End: 2021-12-14 | Stop reason: HOSPADM

## 2021-12-14 RX ORDER — ROCURONIUM BROMIDE 10 MG/ML
INJECTION, SOLUTION INTRAVENOUS PRN
Status: DISCONTINUED | OUTPATIENT
Start: 2021-12-14 | End: 2021-12-14 | Stop reason: SDUPTHER

## 2021-12-14 RX ORDER — ONDANSETRON 2 MG/ML
INJECTION INTRAMUSCULAR; INTRAVENOUS PRN
Status: DISCONTINUED | OUTPATIENT
Start: 2021-12-14 | End: 2021-12-14 | Stop reason: SDUPTHER

## 2021-12-14 RX ORDER — BUPIVACAINE HYDROCHLORIDE 5 MG/ML
INJECTION, SOLUTION EPIDURAL; INTRACAUDAL PRN
Status: DISCONTINUED | OUTPATIENT
Start: 2021-12-14 | End: 2021-12-14 | Stop reason: ALTCHOICE

## 2021-12-14 RX ORDER — DOCUSATE SODIUM 100 MG/1
100 CAPSULE, LIQUID FILLED ORAL 2 TIMES DAILY
Qty: 30 CAPSULE | Refills: 0 | Status: SHIPPED | OUTPATIENT
Start: 2021-12-14

## 2021-12-14 RX ORDER — PROPOFOL 10 MG/ML
INJECTION, EMULSION INTRAVENOUS PRN
Status: DISCONTINUED | OUTPATIENT
Start: 2021-12-14 | End: 2021-12-14 | Stop reason: SDUPTHER

## 2021-12-14 RX ORDER — FENTANYL CITRATE 50 UG/ML
INJECTION, SOLUTION INTRAMUSCULAR; INTRAVENOUS PRN
Status: DISCONTINUED | OUTPATIENT
Start: 2021-12-14 | End: 2021-12-14 | Stop reason: SDUPTHER

## 2021-12-14 RX ADMIN — ONDANSETRON 4 MG: 2 INJECTION INTRAMUSCULAR; INTRAVENOUS at 07:34

## 2021-12-14 RX ADMIN — FENTANYL CITRATE 25 MCG: 50 INJECTION, SOLUTION INTRAMUSCULAR; INTRAVENOUS at 08:43

## 2021-12-14 RX ADMIN — HYDROMORPHONE HYDROCHLORIDE 0.5 MG: 1 INJECTION, SOLUTION INTRAMUSCULAR; INTRAVENOUS; SUBCUTANEOUS at 09:54

## 2021-12-14 RX ADMIN — PROPOFOL 30 MG: 10 INJECTION, EMULSION INTRAVENOUS at 07:32

## 2021-12-14 RX ADMIN — ENOXAPARIN SODIUM 40 MG: 100 INJECTION SUBCUTANEOUS at 06:56

## 2021-12-14 RX ADMIN — HYDROMORPHONE HYDROCHLORIDE 0.5 MG: 1 INJECTION, SOLUTION INTRAMUSCULAR; INTRAVENOUS; SUBCUTANEOUS at 09:46

## 2021-12-14 RX ADMIN — HYDROMORPHONE HYDROCHLORIDE 0.5 MG: 1 INJECTION, SOLUTION INTRAMUSCULAR; INTRAVENOUS; SUBCUTANEOUS at 09:34

## 2021-12-14 RX ADMIN — PROPOFOL 20 MG: 10 INJECTION, EMULSION INTRAVENOUS at 07:59

## 2021-12-14 RX ADMIN — FENTANYL CITRATE 50 MCG: 50 INJECTION, SOLUTION INTRAMUSCULAR; INTRAVENOUS at 08:24

## 2021-12-14 RX ADMIN — CLINDAMYCIN PHOSPHATE 600 MG: 600 INJECTION, SOLUTION INTRAVENOUS at 07:36

## 2021-12-14 RX ADMIN — SUGAMMADEX 200 MG: 100 INJECTION, SOLUTION INTRAVENOUS at 08:58

## 2021-12-14 RX ADMIN — SODIUM CHLORIDE, POTASSIUM CHLORIDE, SODIUM LACTATE AND CALCIUM CHLORIDE: 600; 310; 30; 20 INJECTION, SOLUTION INTRAVENOUS at 06:55

## 2021-12-14 RX ADMIN — DEXAMETHASONE SODIUM PHOSPHATE 4 MG: 4 INJECTION, SOLUTION INTRAMUSCULAR; INTRAVENOUS at 07:34

## 2021-12-14 RX ADMIN — FENTANYL CITRATE 50 MCG: 50 INJECTION, SOLUTION INTRAMUSCULAR; INTRAVENOUS at 07:54

## 2021-12-14 RX ADMIN — ROCURONIUM BROMIDE 50 MG: 10 INJECTION INTRAVENOUS at 07:31

## 2021-12-14 RX ADMIN — FENTANYL CITRATE 50 MCG: 50 INJECTION, SOLUTION INTRAMUSCULAR; INTRAVENOUS at 07:31

## 2021-12-14 RX ADMIN — HYDROMORPHONE HYDROCHLORIDE 0.5 MG: 1 INJECTION, SOLUTION INTRAMUSCULAR; INTRAVENOUS; SUBCUTANEOUS at 10:01

## 2021-12-14 RX ADMIN — LIDOCAINE HYDROCHLORIDE 50 MG: 10 INJECTION, SOLUTION EPIDURAL; INFILTRATION; INTRACAUDAL; PERINEURAL at 07:31

## 2021-12-14 RX ADMIN — PROPOFOL 150 MG: 10 INJECTION, EMULSION INTRAVENOUS at 07:31

## 2021-12-14 ASSESSMENT — PULMONARY FUNCTION TESTS
PIF_VALUE: 18
PIF_VALUE: 21
PIF_VALUE: 22
PIF_VALUE: 12
PIF_VALUE: 18
PIF_VALUE: 6
PIF_VALUE: 23
PIF_VALUE: 4
PIF_VALUE: 23
PIF_VALUE: 21
PIF_VALUE: 23
PIF_VALUE: 21
PIF_VALUE: 22
PIF_VALUE: 22
PIF_VALUE: 23
PIF_VALUE: 15
PIF_VALUE: 15
PIF_VALUE: 13
PIF_VALUE: 23
PIF_VALUE: 23
PIF_VALUE: 16
PIF_VALUE: 22
PIF_VALUE: 23
PIF_VALUE: 23
PIF_VALUE: 16
PIF_VALUE: 15
PIF_VALUE: 23
PIF_VALUE: 12
PIF_VALUE: 16
PIF_VALUE: 23
PIF_VALUE: 22
PIF_VALUE: 20
PIF_VALUE: 23
PIF_VALUE: 23
PIF_VALUE: 15
PIF_VALUE: 18
PIF_VALUE: 15
PIF_VALUE: 22
PIF_VALUE: 23
PIF_VALUE: 23
PIF_VALUE: 21
PIF_VALUE: 23
PIF_VALUE: 18
PIF_VALUE: 19
PIF_VALUE: 23
PIF_VALUE: 21
PIF_VALUE: 22
PIF_VALUE: 18
PIF_VALUE: 23
PIF_VALUE: 22
PIF_VALUE: 18
PIF_VALUE: 11
PIF_VALUE: 23
PIF_VALUE: 23
PIF_VALUE: 20
PIF_VALUE: 21
PIF_VALUE: 23
PIF_VALUE: 21
PIF_VALUE: 23
PIF_VALUE: 13
PIF_VALUE: 12
PIF_VALUE: 15
PIF_VALUE: 16
PIF_VALUE: 23
PIF_VALUE: 15
PIF_VALUE: 23
PIF_VALUE: 18
PIF_VALUE: 23
PIF_VALUE: 21
PIF_VALUE: 18
PIF_VALUE: 18
PIF_VALUE: 23
PIF_VALUE: 15
PIF_VALUE: 0
PIF_VALUE: 23
PIF_VALUE: 21
PIF_VALUE: 23
PIF_VALUE: 13
PIF_VALUE: 15
PIF_VALUE: 12
PIF_VALUE: 22
PIF_VALUE: 18
PIF_VALUE: 0
PIF_VALUE: 23
PIF_VALUE: 23
PIF_VALUE: 4
PIF_VALUE: 23
PIF_VALUE: 23
PIF_VALUE: 21
PIF_VALUE: 23
PIF_VALUE: 3
PIF_VALUE: 18
PIF_VALUE: 18
PIF_VALUE: 26
PIF_VALUE: 3
PIF_VALUE: 23
PIF_VALUE: 23
PIF_VALUE: 21
PIF_VALUE: 22
PIF_VALUE: 23
PIF_VALUE: 22
PIF_VALUE: 0

## 2021-12-14 ASSESSMENT — PAIN SCALES - GENERAL
PAINLEVEL_OUTOF10: 4
PAINLEVEL_OUTOF10: 7
PAINLEVEL_OUTOF10: 0
PAINLEVEL_OUTOF10: 7

## 2021-12-14 ASSESSMENT — PAIN DESCRIPTION - FREQUENCY: FREQUENCY: CONTINUOUS

## 2021-12-14 ASSESSMENT — PAIN DESCRIPTION - ONSET: ONSET: ON-GOING

## 2021-12-14 ASSESSMENT — PAIN DESCRIPTION - PAIN TYPE: TYPE: SURGICAL PAIN

## 2021-12-14 ASSESSMENT — PAIN DESCRIPTION - ORIENTATION: ORIENTATION: MID

## 2021-12-14 ASSESSMENT — PAIN - FUNCTIONAL ASSESSMENT: PAIN_FUNCTIONAL_ASSESSMENT: 0-10

## 2021-12-14 ASSESSMENT — PAIN DESCRIPTION - LOCATION: LOCATION: ABDOMEN

## 2021-12-14 ASSESSMENT — PAIN DESCRIPTION - DESCRIPTORS: DESCRIPTORS: ACHING

## 2021-12-14 NOTE — OP NOTE
Operative Note      Patient: Manuel Hargrove  YOB: 1965  MRN: [id number]    Date of Procedure: 12/14/2021    Pre-Op Diagnosis: Symptomatic cholelithiasis [K80.20]    Post-Op Diagnosis: Same       Procedure(s):  LAPAROSCOPIC CHOLECYSTECTOMY WITH CHOLANGIOGRAM    Surgeon(s):  Ophelia Bar MD    Assistant:   Resident: Karis Kay MD    Anesthesia: General    Estimated Blood Loss (mL): Minimal    Complications: None    Specimens:   ID Type Source Tests Collected by Time Destination   A : GALLBLADDER Tissue Gallbladder SURGICAL PATHOLOGY Ophelia Bar MD 12/14/2021 3810        Implants:  * No implants in log *      Drains: * No LDAs found *    Findings: Normal intraoperative cholangiogram with filling of right and left hepatic ducts with normal emptying into duodenum. Detailed Description of Procedure: The patient was brought to the operating room and placed in the supine position. After adequate induction of general anesthetics, the patient was prepped and draped in a sterile fashion. A site was selected above the umbilicus for entrance into the abdominal cavity. The skin was anesthetized with 0.5% Marcaine. An incision was then made using a 11-blade scalpel and carried down through the subcutaneous tissue to expose the anterior fascia. The fascia was grasped and raised into the incision using two towel clips. An incision in the fascia was made using an 11-blade scalpel. Blunt dissection was used to gain entrance into the abdominal cavity. Following entrance into the abdominal cavity, the 10-mm Jackie trocar was then introduced. The abdomen was insufflated. Three additional 5-mm trocars were placed in the epigastrium, midclavicular line, and right anterior axillary line. The fundus of the gallbladder was grasped and retracted cephalad. The omental attachments to the gallbladder were taken down using electrocautery to allow for full exposure of the gallbladder.   Artery was identified circumferentially dissected free and clearly showed to be entering into the gallbladder. 3 clips were applied to the artery and that was divided using scissors. The cystic duct was then identified circumferentially dissected free and clearly showed to be entering into the gallbladder. A clip was then applied to the proximal cystic duct. The cystic duct was opened and a cholangiocatheter was positioned into the duct. Cholangiogram was performed which revealed no evidence of choledocholithiasis prompt filling of the duodenum and appropriate filling of the intrahepatic biliary tree. Catheter was removed and 2 clips were applied to the distal cystic duct and was divided using scissors. Gallbladder was then removed from the gallbladder bed using electrocautery and placed in a retrieval bag. Additional clips were placed as needed for vessels noted to be entering into the gallbladder. We copiously irrigated the right upper quadrant, aspirated back to clear fluid. Gallbladder bed was again inspected. There was good hemostasis noted throughout. Clips were all occlusive and intact. At this point, we de-insufflated the abdomen with clear visualization of the trocars being removed during this process. We removed the gallbladder from the abdomen along with the 10-mm trocar. The 10-mm trocar site was closed using interrupted #0 Vicryl suture. The wounds were all irrigated, and the skin at the trocar sites were closed using 4-0 Monocryl. The wounds were cleaned, dried, and dressed with skin glue. The patient tolerated the procedure well, was woken up and brought to the PACU in stable condition. All counts were correct x2 at the end of the procedure. No complications. Dr. Farheen Navas was present and scrubbed for the entire case.         Electronically signed by Shani Zavala MD on 12/14/2021 at 6:18 PM

## 2021-12-14 NOTE — ANESTHESIA PRE PROCEDURE
Department of Anesthesiology  Preprocedure Note       Name:  Santo Nuno   Age:  55 y.o.  :  1965                                          MRN:  [id number]         Date:  2021      Surgeon: Lilliam García):  Mika Torrez MD    Procedure: Procedure(s):  LAPAROSCOPIC CHOLECYSTECTOMY WITH CHOLANGIOGRAM    Medications prior to admission:   Prior to Admission medications    Medication Sig Start Date End Date Taking? Authorizing Provider   UNABLE TO FIND every 30 days Allergy shots around the  of the month   Yes Historical Provider, MD   Triamcinolone Acetonide (NASACORT AQ NA) by Nasal route daily    Historical Provider, MD   fexofenadine (ALLEGRA) 180 MG tablet Take 180 mg by mouth daily. Historical Provider, MD       Current medications:    Current Facility-Administered Medications   Medication Dose Route Frequency Provider Last Rate Last Admin    clindamycin (CLEOCIN) 600 mg in dextrose 5 % 50 mL IVPB  600 mg IntraVENous Once Mika Torrez MD        lactated ringers infusion   IntraVENous Continuous Carmen Vale  mL/hr at 21 0655 New Bag at 21 0655    sodium chloride flush 0.9 % injection 5-40 mL  5-40 mL IntraVENous 2 times per day Carmen Vale MD        sodium chloride flush 0.9 % injection 5-40 mL  5-40 mL IntraVENous PRN Carmen Vale MD        0.9 % sodium chloride infusion  25 mL IntraVENous PRN Carmen Vale MD        lidocaine PF 1 % injection 1 mL  1 mL IntraDERmal Once PRN Carmen Vale MD        HYDROmorphone (DILAUDID) injection 0.25 mg  0.25 mg IntraVENous Q5 Min PRN Peggye Ezio, DO        HYDROmorphone (DILAUDID) injection 0.5 mg  0.5 mg IntraVENous Q5 Min PRN Peggyyves Holguin, DO        0.9 % sodium chloride bolus  500 mL IntraVENous Once PRN Peggye Ezio, DO        meperidine (DEMEROL) injection 12.5 mg  12.5 mg IntraVENous Q5 Min PRN Peggye Holguin, DO           Allergies:     Allergies   Allergen Reactions    Aleve [Naproxen Sodium]      Hives    Aspirin      hives    Erythromycin      Hives  disorientation    Ibuprofen      Hives     Influenza Vac Typ A&B Surf Ant      Swelling & throat @young age before teen age year   [employer] Nitrous Oxide      Swelling & throat    Penicillins      Hives & swelling    Sulfa Antibiotics      Swelling & Hives        Problem List:    Patient Active Problem List   Diagnosis Code    Sinusitis, chronic J32.9    Allergic rhinitis due to other allergen J30.89    Menopause Z78.0    History of tobacco use Z87.891    Patient overweight E66.3    Plantar fasciitis, left M72.2    Pulmonary embolism without acute cor pulmonale (HCC) I26.99       Past Medical History:        Diagnosis Date    Chronic sinusitis     DVT (deep venous thrombosis) ([facility] [address] 2017    post-op    Environmental allergies     Mitral (valve) prolapse        Past Surgical History:        Procedure Laterality Date    LEG SURGERY Left 2017    Fx Tibia & fibula + plate & pins    SINUS SURGERY  age 32 yo       Social History:    Social History     Tobacco Use    Smoking status: Former Smoker     Packs/day: 0.50     Years: 25.00     Pack years: 12.50     Types: Cigarettes     Quit date: 3/24/2014     Years since quittin.7    Smokeless tobacco: Never Used    Tobacco comment: advice   Substance Use Topics    Alcohol use: Yes     Comment: rarely                                Counseling given: Not Answered  Comment: advice      Vital Signs (Current):   Vitals:    21 1544 21 0619   BP:  122/85   Pulse:  79   Resp:  15   Temp:  97.7 °F (36.5 °C)   TempSrc:  Temporal   SpO2:  97%   Weight: 191 lb (86.6 kg) 191 lb (86.6 kg)   Height: 5' 4.5\" (1.638 m) 5' 4.5\" (1.638 m)                                              BP Readings from Last 3 Encounters:   21 122/85   21 116/87   21 113/83       NPO Status: Time of last liquid consumption:                         Time of last solid consumption:  Date of last liquid consumption: 12/13/21                        Date of last solid food consumption: 12/13/21    BMI:   Wt Readings from Last 3 Encounters:   12/14/21 191 lb (86.6 kg)   12/03/21 191 lb (86.6 kg)   11/19/21 192 lb 6.4 oz (87.3 kg)     Body mass index is 32.28 kg/m². CBC:   Lab Results   Component Value Date    WBC 6.0 11/12/2021    RBC 4.06 11/12/2021    HGB 13.7 11/12/2021    HCT 40.3 11/12/2021    MCV 99.4 11/12/2021    RDW 13.2 11/12/2021     11/12/2021       CMP:   Lab Results   Component Value Date     11/12/2021    K 4.5 11/12/2021     11/12/2021    CO2 25 11/12/2021    BUN 8 11/12/2021    CREATININE 0.7 11/12/2021    GFRAA >60 11/12/2021    AGRATIO 1.9 11/12/2021    LABGLOM >60 11/12/2021    GLUCOSE 90 11/12/2021    PROT 6.7 11/12/2021    CALCIUM 9.5 11/12/2021    BILITOT 0.5 11/12/2021    ALKPHOS 112 11/12/2021    AST 16 11/12/2021    ALT 17 11/12/2021       POC Tests: No results for input(s): POCGLU, POCNA, POCK, POCCL, POCBUN, POCHEMO, POCHCT in the last 72 hours.     Coags: No results found for: PROTIME, INR, APTT    HCG (If Applicable): No results found for: PREGTESTUR, PREGSERUM, HCG, HCGQUANT     ABGs: No results found for: PHART, PO2ART, NDG6FOX, GNG8UQT, BEART, U3YXTCOY     Type & Screen (If Applicable):  No results found for: LABABO, LABRH    Drug/Infectious Status (If Applicable):  No results found for: HIV, HEPCAB    COVID-19 Screening (If Applicable): No results found for: COVID19        Anesthesia Evaluation  Patient summary reviewed and Nursing notes reviewed no history of anesthetic complications:   Airway: Mallampati: III  TM distance: >3 FB   Neck ROM: full  Mouth opening: > = 3 FB Dental:    (+) upper dentures      Pulmonary:Negative Pulmonary ROS and normal exam  breath sounds clear to auscultation                             Cardiovascular:  Exercise tolerance: good (>4 METS),   (+) valvular problems/murmurs: MVP,         Rhythm: regular  Rate: normal                    Neuro/Psych:   Negative Neuro/Psych ROS              GI/Hepatic/Renal: Neg GI/Hepatic/Renal ROS            Endo/Other: Negative Endo/Other ROS                    Abdominal:   (+) obese,     Abdomen: soft. Vascular: negative vascular ROS. Other Findings:             Anesthesia Plan      general     ASA 2       Induction: intravenous. MIPS: Postoperative opioids intended and Prophylactic antiemetics administered. Anesthetic plan and risks discussed with patient. Use of blood products discussed with patient whom consented to blood products. Plan discussed with CRNA.     Attending anesthesiologist reviewed and agrees with Preprocedure content              Nabila Ugarte DO   12/14/2021

## 2021-12-14 NOTE — TELEPHONE ENCOUNTER
----- Message from Maria Evans sent at 12/14/2021  2:02 PM EST -----  Subject: Message to Provider    QUESTIONS  Information for Provider? patient had surgery today wants the doctor to   know that it went well and thank you   ---------------------------------------------------------------------------  --------------  CALL BACK INFO  What is the best way for the office to contact you? OK to leave message on   voicemail  Preferred Call Back Phone Number? [phone]  ---------------------------------------------------------------------------  --------------  SCRIPT ANSWERS  Relationship to Patient?  Self

## 2021-12-14 NOTE — H&P
Food Insecurity    Worried About Running Out of Food in the Last Year: Never true    Zita of Food in the Last Year: Never true   Transportation Needs:     Lack of Transportation (Medical): Not on file    Lack of Transportation (Non-Medical): Not on file   Physical Activity:     Days of Exercise per Week: Not on file    Minutes of Exercise per Session: Not on file   Stress:     Feeling of Stress : Not on file   Social Connections:     Frequency of Communication with Friends and Family: Not on file    Frequency of Social Gatherings with Friends and Family: Not on file    Attends Ep[employer] Services: Not on file    Active Member of [address] [place] [address] [employer] or Organizations: Not on file    Attends Club or Organization Meetings: Not on file    Marital Status: Not on file   Intimate Partner Violence:     Fear of Current or Ex-Partner: Not on file    Emotionally Abused: Not on file    Physically Abused: Not on file    Sexually Abused: Not on file   Housing Stability:     Unable to Pay for Housing in the Last Year: Not on file    Number of [place] in the Last Year: Not on file    Unstable Housing in the Last Year: Not on file         Medications Prior to Admission:      Prior to Admission medications    Medication Sig Start Date End Date Taking? Authorizing Provider   Triamcinolone Acetonide (NASACORT AQ NA) by Nasal route daily    Historical Provider, MD   fexofenadine (ALLEGRA) 180 MG tablet Take 180 mg by mouth daily.     Historical Provider, MD         Allergies:  Aleve [naproxen sodium], Aspirin, Erythromycin, Ibuprofen, Influenza vac typ a&b surf ant, Nitrous oxide, Penicillins, and Sulfa antibiotics    PHYSICAL EXAM:      /85   Pulse 79   Temp 97.7 °F (36.5 °C) (Temporal)   Resp 15   Ht 5' 4.5\" (1.638 m)   Wt 191 lb (86.6 kg)   LMP 08/07/2010   SpO2 97%   BMI 32.28 kg/m²      Airway:  Airway patent with no audible stridor    Heart:  Regular rate and rhythm, No murmur noted    Lungs:  No increased work of breathing, good air exchange, clear to auscultation bilaterally, no crackles or wheezing    Abdomen:  Soft, non-distended, non-tender, no rebound tenderness or guarding, and no masses palpated    ASSESSMENT AND PLAN     Patient is a 55 y.o. female with above specified procedure planned. 1.  The patients history and physical was obtained and signed off by the pre-admission testing department. Patient seen and focused exam done today- no new changes since last physical exam on 12/3/21    2. Access to ancillary services are available per request of the provider.     JOSSELYN Thomas - DIRK     12/14/2021

## 2021-12-14 NOTE — ANESTHESIA POSTPROCEDURE EVALUATION
Department of Anesthesiology  Postprocedure Note    Patient: Isaac Matute  MRN: [id number]  YOB: 1965  Date of evaluation: 12/14/2021  Time:  9:28 AM     Procedure Summary     Date: 12/14/21 Room / Location: [address] [place] [address]    Anesthesia Start: 7929 Anesthesia Stop: 7182    Procedure: LAPAROSCOPIC CHOLECYSTECTOMY WITH CHOLANGIOGRAM (N/A ) Diagnosis:       Symptomatic cholelithiasis      (Symptomatic cholelithiasis [K80.20])    Surgeons: Joseluis Meza MD Responsible Provider: Ayah Tillman DO    Anesthesia Type: general ASA Status: 2          Anesthesia Type: general    Nicholas Phase I: Nicholas Score: 8    Nicholas Phase II:      Last vitals: Reviewed and per EMR flowsheets.        Anesthesia Post Evaluation    Patient location during evaluation: PACU  Patient participation: complete - patient participated  Level of consciousness: awake  Pain score: 5  Airway patency: patent  Nausea & Vomiting: no nausea and no vomiting  Complications: no  Cardiovascular status: hemodynamically stable  Respiratory status: acceptable  Hydration status: euvolemic  Multimodal analgesia pain management approach

## 2021-12-14 NOTE — PROGRESS NOTES
Patient admitted to PACU # 17 from OR at 0914 post LAPAROSCOPIC CHOLECYSTECTOMY WITH CHOLANGIOGRAM   per Dr. SANCHEZ [facility]. Attached to PACU monitoring system and report received from anesthesia provider. Patient was reported to be hemodynamically stable during procedure. Patient drowsy on admission and denied pain. Pt has 4 lap surgical incisions all with surgical glue that are c/d/i. Pt ST on monitor. IVF infusing. SCDs to BLE and on. Will continue to monitor.

## 2021-12-14 NOTE — BRIEF OP NOTE
Brief Postoperative Note      Patient: Neel Monsivais  YOB: 1965  MRN: [id number]    Date of Procedure: 12/14/2021    Pre-Op Diagnosis: Symptomatic cholelithiasis [K80.20]    Post-Op Diagnosis: Same       Procedure(s):  LAPAROSCOPIC CHOLECYSTECTOMY WITH CHOLANGIOGRAM    Surgeon(s):  Celio Ruiz MD    Assistant:  Resident: Gil Reddy MD    Anesthesia: General    Estimated Blood Loss (mL): Minimal    Complications: None    Specimens:   ID Type Source Tests Collected by Time Destination   A : GALLBLADDER Tissue Gallbladder SURGICAL PATHOLOGY Celio Ruiz MD 12/14/2021 6910        Implants:  * No implants in log *      Drains: * No LDAs found *    Findings: Normal intraoperative cholangiogram with filling of right and left hepatic ducts with normal emptying into duodenum.       Electronically signed by Gil Reddy MD on 12/14/2021 at 9:01 AM

## 2021-12-15 ENCOUNTER — TELEPHONE (OUTPATIENT)
Dept: PRIMARY CARE CLINIC | Age: 56
End: 2021-12-15

## 2021-12-15 NOTE — TELEPHONE ENCOUNTER
[facility] Initial Follow Up Call    Outreach made within 2 business days of discharge: Yes    Patient: Sharron Beckford Patient : 1965   MRN: <[id number]>  Reason for Admission: There are no discharge diagnoses documented for the most recent discharge.   Discharge Date: 21       [facility]    Discharge department/facility: [facility]    }    Scheduled appointment with Surgeon within 7-14 days    Follow Up  Future Appointments   Date Time Provider Dayanara Alves   2021  2:30 PM Rand Dickerson MD [address] [address] [place], [place]

## 2021-12-27 ENCOUNTER — OFFICE VISIT (OUTPATIENT)
Dept: SURGERY | Age: 56
End: 2021-12-27

## 2021-12-27 VITALS
TEMPERATURE: 97.7 F | HEIGHT: 64 IN | DIASTOLIC BLOOD PRESSURE: 96 MMHG | SYSTOLIC BLOOD PRESSURE: 137 MMHG | WEIGHT: 193 LBS | BODY MASS INDEX: 32.95 KG/M2 | HEART RATE: 93 BPM

## 2021-12-27 DIAGNOSIS — Z09 POSTOP CHECK: Primary | ICD-10-CM

## 2021-12-27 PROCEDURE — 99024 POSTOP FOLLOW-UP VISIT: CPT | Performed by: SURGERY

## 2021-12-27 NOTE — PROGRESS NOTES
PATIENT NAME: Darcy Dancer OF BIRTH: 1965     TODAY'S DATE: 2021    Reason for Visit:  Post-op check    Requesting Physician:  Dr. Beard Public:              The patient is a 55 y.o. female with a PMHx as delineated below who presents for follow up without complaints. Chief Complaint   Patient presents with    Post-Op Check     lap.  cholecystectomy with gram 21       REVIEW OF SYSTEMS:  CONSTITUTIONAL:  negative  HEENT:  negative  RESPIRATORY:  negative  CARDIOVASCULAR:  negative  GASTROINTESTINAL:  negative except for abdominal pain  GENITOURINARY:  negative  HEMATOLOGIC/LYMPHATIC:  negative  MUSCULOSKELETAL: negative  NEUROLOGICAL:  negative    PMH  Past Medical History:   Diagnosis Date    Chronic sinusitis     DVT (deep venous thrombosis) ([facility] [address] 2017    post-op    Environmental allergies     Mitral (valve) prolapse        PSH  Past Surgical History:   Procedure Laterality Date    CHOLECYSTECTOMY, LAPAROSCOPIC N/A 2021    LAPAROSCOPIC CHOLECYSTECTOMY WITH CHOLANGIOGRAM performed by Davonte Smallwood MD at [facility] 2017    Fx Tibia & fibula + plate & pins    SINUS SURGERY  age 32 yo       Social History  Social History     Socioeconomic History    Marital status:      Spouse name: Ramana Lemus Number of children: 0    Years of education: Not on file    Highest education level: Not on file   Occupational History     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   Tobacco Use    Smoking status: Former Smoker     Packs/day: 0.50     Years: 25.00     Pack years: 12.50     Types: Cigarettes     Quit date: 3/24/2014     Years since quittin.7    Smokeless tobacco: Never Used    Tobacco comment: advice   Substance and Sexual Activity    Alcohol use: Yes     Comment: rarely    Drug use: No    Sexual activity: Yes     Partners: Male     Comment: Dating x 16 years ,No biological kids   Other Topics Concern    Not on file   Social History Narrative     x 1 year after 21 years dating    No children    No tobacco    FT job     Social Determinants of Health     Financial Resource Strain: Low Risk     Difficulty of Paying Living Expenses: Not hard at all   Food Insecurity: No Food Insecurity    Worried About Running Out of Food in the Last Year: Never true    [address] in the Last Year: Never true   Transportation Needs:     Lack of Transportation (Medical): Not on file    Lack of Transportation (Non-Medical):  Not on file   Physical Activity:     Days of Exercise per Week: Not on file    Minutes of Exercise per Session: Not on file   Stress:     Feeling of Stress : Not on file   Social Connections:     Frequency of Communication with Friends and Family: Not on file    Frequency of Social Gatherings with Friends and Family: Not on file    Attends Baptism Services: Not on file    Active Member of [address] [place] [address] [employer] or Organizations: Not on file    Attends Club or Organization Meetings: Not on file    Marital Status: Not on file   Intimate Partner Violence:     Fear of Current or Ex-Partner: Not on file    Emotionally Abused: Not on file    Physically Abused: Not on file    Sexually Abused: Not on file   Housing Stability:     Unable to Pay for Housing in the Last Year: Not on file    Number of [place] in the Last Year: Not on file    Unstable Housing in the Last Year: Not on file       Family History:       Problem Relation Age of Onset    Other Mother         DM ,Kid disorder ,+ renal stent + heart ,age [de-identified]     Diabetes Father         CABG x4  age 80     High Cholesterol Father     Heart Disease Father     Other Maternal Grandfather          @age 80    Other Sister         @age 52 Brain aneurysm       Allergy:   Allergies   Allergen Reactions    Aleve [Naproxen Sodium]      Hives    Aspirin      hives    Erythromycin      Hives  disorientation    Ibuprofen      Hives     Influenza Vac Typ A&B Surf Ant Swelling & throat @young age before teen age year   [employer] Nitrous Oxide      Swelling & throat    Penicillins      Hives & swelling    Sulfa Antibiotics      Swelling & Hives        PHYSICAL EXAM:  VITALS:  BP (!) 137/96   Pulse 93   Temp 97.7 °F (36.5 °C)   Ht 5' 4\" (1.626 m)   Wt 193 lb (87.5 kg)   LMP 08/07/2010   BMI 33.13 kg/m²     CONSTITUTIONAL:  alert, no apparent distress and mildly obese  EYES:  sclera clear  ENT:  normocepalic, without obvious abnormality  NECK:  supple, symmetrical, trachea midline and no carotid bruits  LUNGS:  clear to auscultation  CARDIOVASCULAR:  regular rate and rhythm and no murmur noted  ABDOMEN:  Incisions healing well, normal bowel sounds, soft, non-distended, non-tender, voluntary guarding absent, no masses palpated and hernia absent  MUSCULOSKELETAL:  0+ pitting edema lower extremities  NEUROLOGIC:  Mental Status Exam:  Level of Alertness:   awake  Orientation:   person, place, time  SKIN:  no bruising or bleeding and normal skin color, texture, turgor    Pathology Review:    Gallbladder:   Cholelithiasis with chronic cholecystitis and prominent   Rokitansky-Aschoff sinuses. IMPRESSION/RECOMMENDATIONS:    The patient is s/p laparoscopic cholecystectomy with gram. The patient is recovering well from surgery and is returning to normal activities. All questions were answered and I will see the patient back in the office on a PRN basis.     Mika Torrez MD

## 2022-04-07 ENCOUNTER — TELEPHONE (OUTPATIENT)
Dept: PRIMARY CARE CLINIC | Age: 57
End: 2022-04-07

## 2023-12-27 ENCOUNTER — OFFICE VISIT (OUTPATIENT)
Dept: PRIMARY CARE CLINIC | Age: 58
End: 2023-12-27
Payer: COMMERCIAL

## 2023-12-27 VITALS
DIASTOLIC BLOOD PRESSURE: 86 MMHG | HEART RATE: 83 BPM | TEMPERATURE: 97.2 F | WEIGHT: 207.8 LBS | SYSTOLIC BLOOD PRESSURE: 130 MMHG | BODY MASS INDEX: 35.67 KG/M2 | OXYGEN SATURATION: 98 %

## 2023-12-27 DIAGNOSIS — E55.9 VITAMIN D DEFICIENCY: ICD-10-CM

## 2023-12-27 DIAGNOSIS — Z13.1 DIABETES MELLITUS SCREENING: ICD-10-CM

## 2023-12-27 DIAGNOSIS — G47.33 OBSTRUCTIVE SLEEP APNEA SYNDROME: ICD-10-CM

## 2023-12-27 DIAGNOSIS — Z12.11 COLON CANCER SCREENING: ICD-10-CM

## 2023-12-27 DIAGNOSIS — R00.2 PALPITATION: ICD-10-CM

## 2023-12-27 DIAGNOSIS — Z00.00 ROUTINE PHYSICAL EXAMINATION: Primary | ICD-10-CM

## 2023-12-27 DIAGNOSIS — Z13.220 LIPID SCREENING: ICD-10-CM

## 2023-12-27 PROCEDURE — 93000 ELECTROCARDIOGRAM COMPLETE: CPT | Performed by: FAMILY MEDICINE

## 2023-12-27 PROCEDURE — 99396 PREV VISIT EST AGE 40-64: CPT | Performed by: FAMILY MEDICINE

## 2023-12-27 ASSESSMENT — PATIENT HEALTH QUESTIONNAIRE - PHQ9
SUM OF ALL RESPONSES TO PHQ9 QUESTIONS 1 & 2: 0
2. FEELING DOWN, DEPRESSED OR HOPELESS: NOT AT ALL
1. LITTLE INTEREST OR PLEASURE IN DOING THINGS: NOT AT ALL

## 2023-12-27 NOTE — PROGRESS NOTES
Pacheco Sexton MD, Sleep Medicine [facility]      3. Diabetes mellitus screening  Hemoglobin A1C      4. Lipid screening  Lipid Panel      5. Vitamin D deficiency  Vitamin D 25 Hydroxy      6.  Colon cancer screening  Fecal DNA Colorectal cancer screening (Cologuard)            No Flu shot  Covid x 3 one booster  EKG wnl  Get Sleep evaluation [place]  Get weight management [place]  Get mammogram  Get GYN

## 2024-01-08 ENCOUNTER — PATIENT MESSAGE (OUTPATIENT)
Dept: PRIMARY CARE CLINIC | Age: 59
End: 2024-01-08

## 2024-01-08 NOTE — TELEPHONE ENCOUNTER
From: Stephanie Chilel  To: Dr. Paula Welch  Sent: 1/8/2024 5:15 PM EST  Subject: Lung screening     I would like to have a CT lung screening as well. I understand it is a cost to me of $100.00. Would Dr. Welch kindly write up the order. I would like to get in on the 19th with my .     Thank you,  Stephanie

## 2024-01-09 ENCOUNTER — TELEPHONE (OUTPATIENT)
Dept: PRIMARY CARE CLINIC | Age: 59
End: 2024-01-09

## 2024-01-09 NOTE — TELEPHONE ENCOUNTER
----- Message from Neha Koo sent at 1/9/2024  8:42 AM EST -----  Subject: Referral Request    Reason for referral request? lung CT scan  Provider patient wants to be referred to(if known):     Provider Phone Number(if known):    Additional Information for Provider? Patient is requesting a order for a   lung CT scan send to [facility] ([facility]   ---------------------------------------------------------------------------  --------------  CALL BACK INFO    [phone]; OK to leave message on voicemail  ---------------------------------------------------------------------------  --------------

## 2024-01-10 DIAGNOSIS — Z87.891 HISTORY OF TOBACCO USE: Primary | ICD-10-CM

## 2024-01-18 LAB — NONINV COLON CA DNA+OCC BLD SCRN STL QL: POSITIVE

## 2024-01-19 DIAGNOSIS — Z12.11 COLON CANCER SCREENING: Primary | ICD-10-CM

## 2024-01-19 DIAGNOSIS — R19.5 POSITIVE COLORECTAL CANCER SCREENING USING COLOGUARD TEST: ICD-10-CM

## 2024-01-27 ENCOUNTER — HOSPITAL ENCOUNTER (OUTPATIENT)
Dept: CT IMAGING | Age: 59
Discharge: HOME | End: 2024-01-27

## 2024-02-05 ENCOUNTER — TELEPHONE (OUTPATIENT)
Dept: CASE MANAGEMENT | Age: 59
End: 2024-02-05

## 2024-02-05 NOTE — TELEPHONE ENCOUNTER
CT Lung Cancer Screening completed on 1/27/2024, ordering provider, Dr. Welch routed radiology results with recommendations & patient viewed via My Chart on 1/30/2024.

## 2024-05-06 DIAGNOSIS — E55.9 VITAMIN D DEFICIENCY: ICD-10-CM

## 2024-05-06 DIAGNOSIS — Z13.1 DIABETES MELLITUS SCREENING: ICD-10-CM

## 2024-05-06 DIAGNOSIS — Z00.00 ROUTINE PHYSICAL EXAMINATION: ICD-10-CM

## 2024-05-06 DIAGNOSIS — Z13.220 LIPID SCREENING: ICD-10-CM

## 2024-05-06 LAB
25(OH)D3 SERPL-MCNC: 15.7 NG/ML
ALBUMIN SERPL-MCNC: 4 G/DL (ref 3.4–5)
ALBUMIN/GLOB SERPL: 1.6 {RATIO} (ref 1.1–2.2)
ALP SERPL-CCNC: 99 U/L (ref 40–129)
ALT SERPL-CCNC: 19 U/L (ref 10–40)
ANION GAP SERPL CALCULATED.3IONS-SCNC: 12 MMOL/L (ref 3–16)
AST SERPL-CCNC: 19 U/L (ref 15–37)
BASOPHILS # BLD: 0.1 K/UL (ref 0–0.2)
BASOPHILS NFR BLD: 1 %
BILIRUB SERPL-MCNC: 0.6 MG/DL (ref 0–1)
BUN SERPL-MCNC: 12 MG/DL (ref 7–20)
CALCIUM SERPL-MCNC: 9.4 MG/DL (ref 8.3–10.6)
CHLORIDE SERPL-SCNC: 103 MMOL/L (ref 99–110)
CHOLEST SERPL-MCNC: 205 MG/DL (ref 0–199)
CO2 SERPL-SCNC: 25 MMOL/L (ref 21–32)
CREAT SERPL-MCNC: 0.7 MG/DL (ref 0.6–1.1)
DEPRECATED RDW RBC AUTO: 12.8 % (ref 12.4–15.4)
EOSINOPHIL # BLD: 0.1 K/UL (ref 0–0.6)
EOSINOPHIL NFR BLD: 1.6 %
GFR SERPLBLD CREATININE-BSD FMLA CKD-EPI: >90 ML/MIN/{1.73_M2}
GLUCOSE SERPL-MCNC: 94 MG/DL (ref 70–99)
HCT VFR BLD AUTO: 40.9 % (ref 36–48)
HDLC SERPL-MCNC: 59 MG/DL (ref 40–60)
HGB BLD-MCNC: 13.7 G/DL (ref 12–16)
LDLC SERPL CALC-MCNC: 129 MG/DL
LYMPHOCYTES # BLD: 2.1 K/UL (ref 1–5.1)
LYMPHOCYTES NFR BLD: 33.1 %
MCH RBC QN AUTO: 33.3 PG (ref 26–34)
MCHC RBC AUTO-ENTMCNC: 33.5 G/DL (ref 31–36)
MCV RBC AUTO: 99.5 FL (ref 80–100)
MONOCYTES # BLD: 0.5 K/UL (ref 0–1.3)
MONOCYTES NFR BLD: 8.5 %
NEUTROPHILS # BLD: 3.5 K/UL (ref 1.7–7.7)
NEUTROPHILS NFR BLD: 55.8 %
PLATELET # BLD AUTO: 317 K/UL (ref 135–450)
PMV BLD AUTO: 9.1 FL (ref 5–10.5)
POTASSIUM SERPL-SCNC: 4.1 MMOL/L (ref 3.5–5.1)
PROT SERPL-MCNC: 6.5 G/DL (ref 6.4–8.2)
RBC # BLD AUTO: 4.11 M/UL (ref 4–5.2)
SODIUM SERPL-SCNC: 140 MMOL/L (ref 136–145)
TRIGL SERPL-MCNC: 83 MG/DL (ref 0–150)
TSH SERPL DL<=0.005 MIU/L-ACNC: 1.49 UIU/ML (ref 0.27–4.2)
VLDLC SERPL CALC-MCNC: 17 MG/DL
WBC # BLD AUTO: 6.3 K/UL (ref 4–11)

## 2024-05-07 LAB
EST. AVERAGE GLUCOSE BLD GHB EST-MCNC: 105.4 MG/DL
HBA1C MFR BLD: 5.3 %

## 2024-05-08 DIAGNOSIS — E55.9 VITAMIN D DEFICIENCY: Primary | ICD-10-CM

## 2024-05-08 RX ORDER — ERGOCALCIFEROL 1.25 MG/1
50000 CAPSULE ORAL WEEKLY
Qty: 12 CAPSULE | Refills: 3 | Status: SHIPPED | OUTPATIENT
Start: 2024-05-08

## 2024-11-04 ENCOUNTER — TELEPHONE (OUTPATIENT)
Dept: CASE MANAGEMENT | Age: 59
End: 2024-11-04

## 2025-01-23 ENCOUNTER — TELEPHONE (OUTPATIENT)
Dept: PRIMARY CARE CLINIC | Age: 60
End: 2025-01-23

## (undated) DEVICE — 40583 XL ADVANCED TRENDELENBURG POSITIONING KIT: Brand: 40583 XL ADVANCED TRENDELENBURG POSITIONING KIT

## (undated) DEVICE — CATHETER URET 4FR L70CM POLYUR WHSTL FLX TIP KINK RESIST W/

## (undated) DEVICE — SCISSORS ENDOSCP DIA5MM CRV MPLR CAUT W/ RATCH HNDL

## (undated) DEVICE — CORD ES L10FT MPLR LAP

## (undated) DEVICE — CLIP INT M L POLYMER LOK LIG HEM O LOK

## (undated) DEVICE — KIT,ANTI FOG,W/SPONGE & FLUID,SOFT PACK: Brand: MEDLINE

## (undated) DEVICE — TISSUE RETRIEVAL SYSTEM: Brand: INZII RETRIEVAL SYSTEM

## (undated) DEVICE — PUMP SUC IRR TBNG L10FT W/ HNDPC ASSEMB STRYKEFLOW 2

## (undated) DEVICE — GLOVE SURG SZ 7 L12IN FNGR THK75MIL WHT LTX POLYMER BEAD

## (undated) DEVICE — LARGE BORE STOPCOCK WITH ROTATING MALE LUER LOCK

## (undated) DEVICE — SUTURE VCRL SZ 0 L27IN ABSRB UD L26MM CT-2 1/2 CIR J270H

## (undated) DEVICE — TROCAR: Brand: KII FIOS FIRST ENTRY

## (undated) DEVICE — E-Z CLEAN, NON-STICK, PTFE COATED, ELECTROSURGICAL BLADE ELECTRODE, MODIFIED EXTENDED INSULATION, 2.5 INCH (6.35 CM): Brand: MEGADYNE

## (undated) DEVICE — TOWEL,STOP FLAG GOLD N-W: Brand: MEDLINE

## (undated) DEVICE — TROCAR: Brand: KII SHIELDED BLADED ACCESS SYSTEM

## (undated) DEVICE — NEEDLE HYPO 22GA L1.5IN BLK POLYPR HUB S STL REG BVL STR

## (undated) DEVICE — GENERAL LAPAROSCOPIC: Brand: MEDLINE INDUSTRIES, INC.

## (undated) DEVICE — SOLUTION INJ LR VISIV 1000ML BG

## (undated) DEVICE — SET EXTN PRIMING 4.9ML L30IN INCL SLDE CLMP SPIN M LUERLOCK

## (undated) DEVICE — MEDIA CONTRAST ISOVUE  300 10X50ML

## (undated) DEVICE — SYRINGE MED 10ML LUERLOCK TIP W/O SFTY DISP

## (undated) DEVICE — SUTURE MCRYL SZ 4-0 L18IN ABSRB UD L19MM PS-2 3/8 CIR PRIM Y496G

## (undated) DEVICE — TROCARS: Brand: KII® BALLOON BLUNT TIP SYSTEM

## (undated) DEVICE — C-ARM: Brand: UNBRANDED

## (undated) DEVICE — APPLICATOR PREP 26ML 0.7% IOD POVACRYLEX 74% ISO ALC ST